# Patient Record
Sex: FEMALE | Race: WHITE
[De-identification: names, ages, dates, MRNs, and addresses within clinical notes are randomized per-mention and may not be internally consistent; named-entity substitution may affect disease eponyms.]

---

## 2019-09-17 ENCOUNTER — HOSPITAL ENCOUNTER (OUTPATIENT)
Dept: HOSPITAL 74 - JASU-SURG | Age: 64
Discharge: HOME | End: 2019-09-17
Attending: OBSTETRICS & GYNECOLOGY
Payer: COMMERCIAL

## 2019-09-17 VITALS — DIASTOLIC BLOOD PRESSURE: 78 MMHG | SYSTOLIC BLOOD PRESSURE: 133 MMHG | HEART RATE: 66 BPM | TEMPERATURE: 98 F

## 2019-09-17 VITALS — BODY MASS INDEX: 32.3 KG/M2

## 2019-09-17 DIAGNOSIS — M19.90: ICD-10-CM

## 2019-09-17 DIAGNOSIS — I25.10: ICD-10-CM

## 2019-09-17 DIAGNOSIS — R93.89: ICD-10-CM

## 2019-09-17 DIAGNOSIS — E78.5: ICD-10-CM

## 2019-09-17 DIAGNOSIS — N95.0: Primary | ICD-10-CM

## 2019-09-17 DIAGNOSIS — Z85.828: ICD-10-CM

## 2019-09-17 DIAGNOSIS — I10: ICD-10-CM

## 2019-09-17 PROCEDURE — 0UDB8ZX EXTRACTION OF ENDOMETRIUM, VIA NATURAL OR ARTIFICIAL OPENING ENDOSCOPIC, DIAGNOSTIC: ICD-10-PCS | Performed by: OBSTETRICS & GYNECOLOGY

## 2019-09-17 NOTE — HP
History & Physical Update





- History


History: No Change





- Physical


Physical: No Change





- Assessment


Assessment: No Change





- Plan


Plan: No Change (H&P reviewed and unchanged from previous one Conset signed and 

witnessed)

## 2019-09-17 NOTE — OP
Operative Note





- Note:


Operative Date: 09/17/19


Pre-Operative Diagnosis: 65yo with thick endometrium


Operation: Hysteroscopy/Polypectomy/D&C


Findings: 





Fibrous tissue blocking entry into fundal portion of the uterus, resected


bloody discharge noted upon entering the cavity


Atrophic endometrium


Surgeon: Shilpa Anglin


Anesthesiologist/CRNA: Ramiro Wagner


Anesthesia: MAC


Estimated Blood Loss (mls): 5


Drains, Volume Out (mls): 30


Fluid Volume Replaced (mls): 700


Operative Report Dictated: Yes

## 2019-09-17 NOTE — OP
DATE OF OPERATION:  

 

PREOPERATIVE DIAGNOSIS:  A 64-year-old with thick endometrium and postmenopausal

bleeding. 

 

PROCEDURE PERFORMED:  Hysteroscopy, polypectomy, dilatation and curettage.  

 

FINDINGS:  Fibrous tissue blocking entry into the fundal portion of the uterus,

resected.  Bloody discharge noted upon entering the cavity.  Atrophic endometrium

found inside the endometrial cavity. 

 

SURGEON:    Shilpa Anglin MD

 

ANESTHESIOLOGIST:  _____ 

 

ANESTHESIA:  MAC. 

 

DESCRIPTION OF PROCEDURE:  After ensuring informed consent, the patient was brought

to the operating room, where she was placed in the dorsal lithotomy position.  The

perineum and vagina were prepped and draped in sterile fashion.  The cervix was

articulated with a single-tooth tenaculum and gradually dilated with DeLee dilators

to accommodate the Symphion hysteroscope.  Symphion hysteroscope was attempted

several times to enter the uterus.  There was difficulty _____ due to this fibrous

tissue, that was resected with the Symphion resectoscope and subsequently entry was

allowed into the cavity.  The cavity was observed and found to be atrophic. 

Bilateral tubal ostia were visualized.  Sharp curettage was performed in the

endometrial cavity.  Subsequently all instruments were removed from the uterus,

cervix and vagina.  

 

Estimated blood loss was 5 mL.  Urine 30 mL was drained prior to the beginning of the

procedure.  The patient received 700 mL of IV fluids.  Sponges and instrument counts

were correct x2. 

 

The patient was brought into the recovery room in stable condition.  

 

 

NICK [id number]

DD: 09/17/2019 09:58

DT: 09/17/2019 10:32

Job #:  [id number]

## 2019-09-18 NOTE — PATH
Surgical Pathology Report



Patient Name:  DOLLY RAMIREZ

Accession #:  [id number]

[facility]. Rec. #:  [id number]                                                        

   /Age/Gender:  1955 (Age: 64) / F

Account:  [id number]                                                          

             Location: [facility] SURGICAL

Taken:  2019

Received:  2019

Reported:  2019

Physicians:  Shilpa Anglin M.D.

  



Specimen(s) Received

 ENDOMETRIAL CURETTINGS 





Clinical History

Hematometra







Final Diagnosis

ENDOMETRIAL CURETTINGS, DILATION AND CURETTAGE:

ABUNDANT FRAGMENTS OF FIBROMUSCULAR TISSUE, BENIGN ENDOCERVICAL AND CERVICAL

SQUAMOUS MUCOSA, AND RARE STRIPS OF ENDOMETRIAL GLANDS IN A BACKGROUND OF MUCUS

AND BLOOD.





***Electronically Signed***

Mary Johnson M.D.





Gross Description

Received in formalin labeled "endometrial curettings" are multiple fragments of

pink-tan, focally hemorrhagic tissue measuring 1.5 x 1.2 x 0.6 cm in aggregate.

Entire specimen submitted in one cassette.

MLSZ/2019



marina/[email or web address]